# Patient Record
Sex: FEMALE | Race: BLACK OR AFRICAN AMERICAN | NOT HISPANIC OR LATINO | Employment: UNEMPLOYED | URBAN - METROPOLITAN AREA
[De-identification: names, ages, dates, MRNs, and addresses within clinical notes are randomized per-mention and may not be internally consistent; named-entity substitution may affect disease eponyms.]

---

## 2020-02-13 DIAGNOSIS — Z36.89 ENCOUNTER FOR FETAL ANATOMIC SURVEY: Primary | ICD-10-CM

## 2020-02-13 DIAGNOSIS — O14.92 PRE-ECLAMPSIA IN SECOND TRIMESTER: ICD-10-CM

## 2020-02-19 ENCOUNTER — PROCEDURE VISIT (OUTPATIENT)
Dept: MATERNAL FETAL MEDICINE | Facility: CLINIC | Age: 25
End: 2020-02-19
Payer: MEDICAID

## 2020-02-19 VITALS
WEIGHT: 275 LBS | BODY MASS INDEX: 45.82 KG/M2 | SYSTOLIC BLOOD PRESSURE: 137 MMHG | DIASTOLIC BLOOD PRESSURE: 91 MMHG | HEIGHT: 65 IN

## 2020-02-19 DIAGNOSIS — O14.92 PRE-ECLAMPSIA IN SECOND TRIMESTER: ICD-10-CM

## 2020-02-19 DIAGNOSIS — Z36.89 ENCOUNTER FOR FETAL ANATOMIC SURVEY: ICD-10-CM

## 2020-02-19 DIAGNOSIS — O14.12 SEVERE PRE-ECLAMPSIA IN SECOND TRIMESTER: ICD-10-CM

## 2020-02-19 DIAGNOSIS — O43.102 PLACENTAL ABNORMALITY IN SECOND TRIMESTER: ICD-10-CM

## 2020-02-19 PROCEDURE — 76819 FETAL BIOPHYS PROFIL W/O NST: CPT | Mod: ,,, | Performed by: OBSTETRICS & GYNECOLOGY

## 2020-02-19 PROCEDURE — 76820 PR US, OB DOPPLER, FETAL UMBILICAL ARTERY ECHO: ICD-10-PCS | Mod: ,,, | Performed by: OBSTETRICS & GYNECOLOGY

## 2020-02-19 PROCEDURE — 76811 PR US, OB FETAL EVAL & EXAM, TRANSABDOM,FIRST GESTATION: ICD-10-PCS | Mod: ,,, | Performed by: OBSTETRICS & GYNECOLOGY

## 2020-02-19 PROCEDURE — 76811 OB US DETAILED SNGL FETUS: CPT | Mod: ,,, | Performed by: OBSTETRICS & GYNECOLOGY

## 2020-02-19 PROCEDURE — 76819 PR US, OB, FETAL BIOPHYSICAL, W/O NST: ICD-10-PCS | Mod: ,,, | Performed by: OBSTETRICS & GYNECOLOGY

## 2020-02-19 PROCEDURE — 76820 UMBILICAL ARTERY ECHO: CPT | Mod: ,,, | Performed by: OBSTETRICS & GYNECOLOGY

## 2020-02-19 PROCEDURE — 99204 OFFICE O/P NEW MOD 45 MIN: CPT | Mod: 25,TH,, | Performed by: OBSTETRICS & GYNECOLOGY

## 2020-02-19 PROCEDURE — 99204 PR OFFICE/OUTPT VISIT, NEW, LEVL IV, 45-59 MIN: ICD-10-PCS | Mod: 25,TH,, | Performed by: OBSTETRICS & GYNECOLOGY

## 2020-02-19 RX ORDER — FERROUS SULFATE 325(65) MG
325 TABLET ORAL
COMMUNITY

## 2020-02-19 RX ORDER — NIFEDIPINE 60 MG/1
60 TABLET, EXTENDED RELEASE ORAL DAILY
COMMUNITY
Start: 2020-02-12

## 2020-02-19 NOTE — PROGRESS NOTES
Chief complaint: Preeclampsia    Provider requesting consultation: Dr. Hayes    24 y.o. at 26w5d EGA     PMH:  Past Medical History:   Diagnosis Date    Anemia     Asthma     Obesity     Pre-eclampsia        PObHx:  OB History    Para Term  AB Living   1             SAB TAB Ectopic Multiple Live Births                  # Outcome Date GA Lbr Jose/2nd Weight Sex Delivery Anes PTL Lv   1 Current                PSH:History reviewed. No pertinent surgical history.    Family history:family history is not on file.    Social history: reports that she has quit smoking. She quit after 9.00 years of use. She has never used smokeless tobacco. She reports that she drank alcohol. She reports that she has current or past drug history.    A detailed fetal anatomical ultrasound was completed today.  See details in imaging section of EPIC.  The fetus is IUGR with absent end diastolic flow.  The BPP was reassuring and the AF was normal.      The patient was seen today for evaluation of preeclampsia early in gestation.  On  she had preeclampsia labs done which revealed a 24 hr urine protein of 550 mg per 24 hr.  The remainder of her laboratory assessment was normal. I am not in possession of any repeat labs.    On today's visit the patient's blood pressure is 137/91 on 60 mg of Procardia XL daily.  She complains of occasional right upper quadrant pain and scotomata.  She does not have headaches.    On several factors the patient qualifies as a severe preeclamptic.  I would suggest hospital admission for steroid administration and possible delivery.  I called [facility] for possible transfer and unfortunately the NICU was on diversion at this point in time.    On PE the patient has 2+ pitting edema and RUQ tenderness.      I discussed this with Dr. Hayes.  She will be sent to [facility] and arrangements made for transfer to [place].    The patient was given an opportunity to ask questions about  management and the diease process.  She expressed an understanding of and agreement to the above impression and plan. All questions were answered to her satisfaction.  She was given contact information to the [facility] clinic to address further concerns.      The approximate physician face-to-face time was 45 minutes. The majority of the time (>50%) was spent on counseling of the patient or coordination of care.

## 2022-02-16 ENCOUNTER — TELEPHONE (OUTPATIENT)
Dept: OBSTETRICS AND GYNECOLOGY | Facility: CLINIC | Age: 27
End: 2022-02-16

## 2022-02-16 ENCOUNTER — OFFICE VISIT (OUTPATIENT)
Dept: OBSTETRICS AND GYNECOLOGY | Facility: CLINIC | Age: 27
End: 2022-02-16
Payer: MEDICAID

## 2022-02-16 VITALS
HEIGHT: 66 IN | SYSTOLIC BLOOD PRESSURE: 108 MMHG | DIASTOLIC BLOOD PRESSURE: 70 MMHG | WEIGHT: 169 LBS | BODY MASS INDEX: 27.16 KG/M2

## 2022-02-16 DIAGNOSIS — Z71.3 ENCOUNTER FOR DIETARY COUNSELING AND SURVEILLANCE: Primary | ICD-10-CM

## 2022-02-16 PROCEDURE — 3008F PR BODY MASS INDEX (BMI) DOCUMENTED: ICD-10-PCS | Mod: CPTII,S$GLB,, | Performed by: NURSE PRACTITIONER

## 2022-02-16 PROCEDURE — 1160F PR REVIEW ALL MEDS BY PRESCRIBER/CLIN PHARMACIST DOCUMENTED: ICD-10-PCS | Mod: CPTII,S$GLB,, | Performed by: NURSE PRACTITIONER

## 2022-02-16 PROCEDURE — 99213 PR OFFICE/OUTPT VISIT, EST, LEVL III, 20-29 MIN: ICD-10-PCS | Mod: S$GLB,,, | Performed by: NURSE PRACTITIONER

## 2022-02-16 PROCEDURE — 3078F DIAST BP <80 MM HG: CPT | Mod: CPTII,S$GLB,, | Performed by: NURSE PRACTITIONER

## 2022-02-16 PROCEDURE — 1159F MED LIST DOCD IN RCRD: CPT | Mod: CPTII,S$GLB,, | Performed by: NURSE PRACTITIONER

## 2022-02-16 PROCEDURE — 1159F PR MEDICATION LIST DOCUMENTED IN MEDICAL RECORD: ICD-10-PCS | Mod: CPTII,S$GLB,, | Performed by: NURSE PRACTITIONER

## 2022-02-16 PROCEDURE — 3074F SYST BP LT 130 MM HG: CPT | Mod: CPTII,S$GLB,, | Performed by: NURSE PRACTITIONER

## 2022-02-16 PROCEDURE — 3078F PR MOST RECENT DIASTOLIC BLOOD PRESSURE < 80 MM HG: ICD-10-PCS | Mod: CPTII,S$GLB,, | Performed by: NURSE PRACTITIONER

## 2022-02-16 PROCEDURE — 3008F BODY MASS INDEX DOCD: CPT | Mod: CPTII,S$GLB,, | Performed by: NURSE PRACTITIONER

## 2022-02-16 PROCEDURE — 1160F RVW MEDS BY RX/DR IN RCRD: CPT | Mod: CPTII,S$GLB,, | Performed by: NURSE PRACTITIONER

## 2022-02-16 PROCEDURE — 3074F PR MOST RECENT SYSTOLIC BLOOD PRESSURE < 130 MM HG: ICD-10-PCS | Mod: CPTII,S$GLB,, | Performed by: NURSE PRACTITIONER

## 2022-02-16 PROCEDURE — 99213 OFFICE O/P EST LOW 20 MIN: CPT | Mod: S$GLB,,, | Performed by: NURSE PRACTITIONER

## 2022-02-16 NOTE — TELEPHONE ENCOUNTER
Phentermine 37.5mg daily # 30, 0 RF called to [facility](Johnny) [phone] per Darryl Hayes Jr.,/ Suki Vicente NP.

## 2022-02-16 NOTE — PROGRESS NOTES
Subjective:       Patient ID: Jessica Tang is a 26 y.o. female.    Chief Complaint: Follow-up (Pt is here to follow up on some medications.)    HPI   She is here today to discuss weight loss. She states she is at a standstill at 169 lbs. Her goal is 150. Her starting weight was 300lbs post preg 2 years ago. She has used phentermine, diet and exercise.She has continued to maintain a steady weight. She would like one more Rx for phentermine to aid in her goal weight.   Review of Systems   Constitutional: Negative for appetite change, chills, fatigue and fever.   HENT: Negative for nasal congestion, rhinorrhea and sore throat.    Respiratory: Negative for cough, chest tightness, shortness of breath and wheezing.    Cardiovascular: Negative for chest pain, palpitations and leg swelling.   Gastrointestinal: Negative for abdominal distention, abdominal pain, blood in stool, constipation, diarrhea, nausea and vomiting.   Endocrine: Negative for cold intolerance, heat intolerance and polyuria.   Genitourinary: Negative for bladder incontinence, dyspareunia, dysuria, flank pain, hot flashes, menstrual irregularity, pelvic pain, urgency, vaginal bleeding and vaginal discharge.   Musculoskeletal: Negative for arthralgias, myalgias and neck pain.   Integumentary:  Negative for rash, breast mass, breast discharge and breast tenderness.   Neurological: Negative for dizziness, syncope and headaches.   Psychiatric/Behavioral: Negative for agitation, sleep disturbance and suicidal ideas.   Breast: Negative for mass and tenderness        Objective:      Physical Exam  Exam conducted with a chaperone present.   Constitutional:       General: She is not in acute distress.     Appearance: Normal appearance. She is not ill-appearing.   HENT:      Head: Normocephalic.      Nose: Nose normal.   Pulmonary:      Effort: Pulmonary effort is normal. No respiratory distress.   Abdominal:      General: Bowel sounds are normal. There is no  distension.      Palpations: Abdomen is soft. There is no mass.      Tenderness: There is no abdominal tenderness.   Musculoskeletal:      Right lower leg: No edema.      Left lower leg: No edema.   Skin:     General: Skin is warm and dry.      Findings: No lesion.   Neurological:      General: No focal deficit present.      Mental Status: She is alert and oriented to person, place, and time.      Motor: No weakness.      Gait: Gait normal.   Psychiatric:         Mood and Affect: Mood normal.         Behavior: Behavior normal.         Thought Content: Thought content normal.         Judgment: Judgment normal.         Assessment:       Problem List Items Addressed This Visit    None     Visit Diagnoses     Encounter for dietary counseling and surveillance    -  Primary          Plan:       Phentermine 37.5mg daily # 30 0 RF. F/u PRN